# Patient Record
Sex: FEMALE
[De-identification: names, ages, dates, MRNs, and addresses within clinical notes are randomized per-mention and may not be internally consistent; named-entity substitution may affect disease eponyms.]

---

## 2019-12-18 PROBLEM — Z00.00 ENCOUNTER FOR PREVENTIVE HEALTH EXAMINATION: Status: ACTIVE | Noted: 2019-12-18

## 2024-01-16 ENCOUNTER — APPOINTMENT (OUTPATIENT)
Dept: PLASTIC SURGERY | Facility: CLINIC | Age: 69
End: 2024-01-16
Payer: COMMERCIAL

## 2024-01-16 VITALS — HEIGHT: 67 IN | WEIGHT: 177 LBS | BODY MASS INDEX: 27.78 KG/M2

## 2024-01-16 DIAGNOSIS — Z86.79 PERSONAL HISTORY OF OTHER DISEASES OF THE CIRCULATORY SYSTEM: ICD-10-CM

## 2024-01-16 DIAGNOSIS — Z78.9 OTHER SPECIFIED HEALTH STATUS: ICD-10-CM

## 2024-01-16 PROCEDURE — 99203 OFFICE O/P NEW LOW 30 MIN: CPT

## 2024-01-16 RX ORDER — MULTIVITAMIN
TABLET ORAL
Refills: 0 | Status: ACTIVE | COMMUNITY

## 2024-01-16 RX ORDER — MULTIVIT-MIN/IRON/FOLIC ACID/K 18-600-40
CAPSULE ORAL
Refills: 0 | Status: ACTIVE | COMMUNITY

## 2024-01-16 RX ORDER — UBIDECARENONE/VIT E ACET 100MG-5
CAPSULE ORAL
Refills: 0 | Status: ACTIVE | COMMUNITY

## 2024-01-16 RX ORDER — ALPRAZOLAM 0.25 MG/1
0.25 TABLET ORAL
Refills: 0 | Status: ACTIVE | COMMUNITY

## 2024-01-16 RX ORDER — ACETAMINOPHEN/DIPHENHYDRAMINE 500MG-25MG
1000 TABLET ORAL
Refills: 0 | Status: ACTIVE | COMMUNITY

## 2024-01-16 RX ORDER — LOSARTAN POTASSIUM 100 MG/1
TABLET, FILM COATED ORAL
Refills: 0 | Status: ACTIVE | COMMUNITY

## 2024-01-16 NOTE — HISTORY OF PRESENT ILLNESS
[FreeTextEntry1] : 69 y/o female with h/o asthma, pre-DM, HTN and macular degeneration who presents for evaluation of left cheek lesion.  Pt first noticed lesion many years ago. Pt had it injected approx 4x by derm at [facility] derm however continues to recur.  Denies drainage, Denies trauma. No h/o facial fillers or facial surgery.   Denies personal or family h/o skin cancer Denies h/o DVT/PE or MRSA infection Former smoker, quit 40 years ago Occupation:

## 2024-01-16 NOTE — PHYSICAL EXAM
[de-identified] : WDWN in NAD [de-identified] : ATNC [de-identified] : NOMANs [de-identified] : Non labored on RA [de-identified] : WILMANR [de-identified] : Left mid cheek with 7mm circular soft mobile non tender subcutaneous mass with no overlying skin changes. Sensation intact. Facial animation intact.

## 2024-01-16 NOTE — ASSESSMENT
[FreeTextEntry1] : as above left cheek cyst steroid injections (four)  on exam left cheek cyst approx 2cm  suggest excision  Regarding the procedure, we discussed scarring, poor wound healing, bleeding, infection, need for additional surgery, and dissatisfaction with the outcome.  Also discussed possibility of keloid and/or hypertrophic scar formation as well as recurrence.  All questions were answered and risks understood.  hold MVI one week prio

## 2024-03-15 ENCOUNTER — APPOINTMENT (OUTPATIENT)
Dept: PLASTIC SURGERY | Facility: CLINIC | Age: 69
End: 2024-03-15
Payer: MEDICARE

## 2024-03-15 PROCEDURE — 11442 EXC FACE-MM B9+MARG 1.1-2 CM: CPT

## 2024-03-15 PROCEDURE — 13131 CMPLX RPR F/C/C/M/N/AX/G/H/F: CPT | Mod: 59

## 2024-03-15 NOTE — PROCEDURE
[FreeTextEntry6] : Patient is a 68 year old female with a left cheek lesion measuring approximately 1.2 x 1.5 cm.    The area was prepped and draped in the usual fashion.  Local anesthetic was administered using 1% lidocaine with epinephrine.  Lesion was sharply excised.  Area was irrigated copiously.  Complex wound closure was performed in layers.  The wound measured approximately 1.8 cm.  Sterile dressing applied.    Patient tolerated procedure well and understands post-op instructions.  Sutures Used:5-0 monocryl, 6-0 prolene

## 2024-03-20 LAB — CORE LAB BIOPSY: NORMAL

## 2024-03-21 PROBLEM — Z86.03 HISTORY OF NEOPLASM OF UNCERTAIN BEHAVIOR OF FACE: Status: RESOLVED | Noted: 2024-01-16 | Resolved: 2024-03-21

## 2024-03-21 PROBLEM — L72.0 EPIDERMAL INCLUSION CYST: Status: ACTIVE | Noted: 2024-03-21

## 2024-03-22 ENCOUNTER — APPOINTMENT (OUTPATIENT)
Dept: PLASTIC SURGERY | Facility: CLINIC | Age: 69
End: 2024-03-22
Payer: MEDICARE

## 2024-03-22 DIAGNOSIS — L72.0 EPIDERMAL CYST: ICD-10-CM

## 2024-03-22 DIAGNOSIS — Z86.03 PERSONAL HISTORY OF NEOPLASM OF UNCERTAIN BEHAVIOR: ICD-10-CM

## 2024-03-22 PROCEDURE — 99024 POSTOP FOLLOW-UP VISIT: CPT

## 2024-03-22 NOTE — PHYSICAL EXAM
[de-identified] : ATNC [de-identified] : WDWN in NAD [de-identified] : NOMANs [de-identified] : Non labored on RA [de-identified] : WILMANR [de-identified] : Left mid cheek vertical incision is CDi, skin edges well approximated with no open areas or drainage. no cellulitis or erythema. prolene sutures in place. Sensation intact. Facial animation intact.

## 2024-03-22 NOTE — ASSESSMENT
[FreeTextEntry1] : as above left cheek cyst steroid injections (four)  on exam left cheek cyst approx 2cm  Pt is 1 week s/p excision of left cheek lesion. doing very well   - D/c sutures - All bandages changed, steri strips placed - Scar management: Once steris fall off, start Aquaphor x 2 weeks then switch to silicone based scar cream  (Biocorneum) - signs and symptoms of infection reviewed - Light activity ok - ok to shower - Path reviewed: EIC - All post op instructions reviewed, all questions answered - f/u prn

## 2024-03-22 NOTE — HISTORY OF PRESENT ILLNESS
[FreeTextEntry1] : 67 y/o female with h/o asthma, pre-DM, HTN and macular degeneration who presents for evaluation of left cheek lesion.  Pt first noticed lesion many years ago. Pt had it injected approx 4x by derm at [facility] derm however continues to recur.  Denies drainage, Denies trauma. No h/o facial fillers or facial surgery.   Denies personal or family h/o skin cancer Denies h/o DVT/PE or MRSA infection Former smoker, quit 40 years ago  Interval hx (3/22/24): Pt is 1 week s/p excision of left cheek lesion. Here for suture removal. doing very well, denies f/c/drainage. changed bandage once herself.

## 2024-03-22 NOTE — DATA REVIEWED
[FreeTextEntry1] : Patient:     SUMAN BERRY  Accession:                             [id number]  Collected Date/Time:                   3/15/2024 14:25 EDT Received Date/Time:                    3/18/2024 09:02 EDT  Surgical Pathology Report - Auth (Verified)  Specimen(s) Submitted Left cheek cyst  Final Diagnosis  Left cheek cyst, excision:   - Consistent with an epidermal inclusion cyst  Verified by: Ramirez Reagan MD (Electronic Signature) Reported on: 03/20/24 15:08 EDT, [facility], [facility], [address] [place] [address] Histology technical [place] [address] Phone: [phone]   Fax: [phone] _________________________________________________________________   Clinical Information Left cheek cyst  Perioperative Diagnosis D48.7-Neoplasm of uncertain behavior of other specified sites  Gross Description The specimen is received in formalin, labeled "left cheek cyst" and consists of a tan ellipse of skin measuring 1.4 x 0.3 x 0.2 cm with attached fibroadipose tissue, extending 0.5 cm all deep.  Specimen is entirely submitted (1 block)  03/18/2024 14:55:13 EDT ys   Disclaimer In addition to other data that may appear on the specimen containers, all labels have been inspected to confirm the presence of the patients name and date of birth.  Specimen was received and underwent gross examination at [facility], [address] [place] [address].  Ordered by: FREDDIE DELEON IV       Collected/Examined: 15Mar2024 02:25PM       Verification Required       Stage: Final       Performed at: [facility] (Med Director: Riaz Caal)       Resulted: 20Mar2024 03:08PM       Last Updated: 20Mar2024 03:08PM       Accession: [id number]       Results Hx:	 There are no additional results to show Details:	 To Be Done:	15Mar2024 Status:	Resulted: Requires Verification For:	Neoplasm of uncertain behavior of face (D48.7) Overdue:	15Jun2024 02:25PM To Be Performed:	[facility] Communicated By:	Requested transmission to performing location Priority:	Routine Ordered By:	FREDDIE DELEON IV Supervised By:	FREDDIE DELEON IV Managed By:	FREDDIE DELEON IV Authorization:	Not Required Performing Instructions:	 Patient Instructions:	 Order Instructions:	 Questions:	 Date/Time Collected A: 15Mar2024 Number of Sites (Enter each site description below.) A: 1 Site of Specimen A: Left cheek cyst Add'l Details:	 Financial Auth:	Not Needed Authorization #:	 Appt. Status:	Appointment Not Needed Effective:	15Mar2024 12:00AM Expires:	15Mar2025 12:00AM Done:	15Mar2024 02:25PM Order #:	[id number] Requisition #:	[id number] Label Type:	 Collection:	Collection Specimen Identifier:	 ID:	 CPT:	 LOINC:	 SNOMED:	 Type:	 Charges:	None Will Be Collected in Office?	No View link item history	 Goals:	None Charging:	          (none) Encounters:	 Creation:	15Mar2024 Appointment FREDDIE DELEON IV (Plastic Surgery)  Collection:	None Specified Be Done By:	None Specified Scheduled:	None Specified Performed:	None Specified Charge :	None Specified Annotations:	          (none) Electronically Signed By: FREDDIE DELEON IV, MD 15-Mar-2024 2:25 PM

## 2025-03-18 ENCOUNTER — APPOINTMENT (OUTPATIENT)
Dept: ORTHOPEDIC SURGERY | Facility: CLINIC | Age: 70
End: 2025-03-18
Payer: MEDICARE

## 2025-03-18 DIAGNOSIS — M65.312 TRIGGER THUMB, LEFT THUMB: ICD-10-CM

## 2025-03-18 PROCEDURE — 99203 OFFICE O/P NEW LOW 30 MIN: CPT | Mod: 25

## 2025-03-18 PROCEDURE — 20550 NJX 1 TENDON SHEATH/LIGAMENT: CPT | Mod: LT

## 2025-04-30 ENCOUNTER — APPOINTMENT (OUTPATIENT)
Dept: ORTHOPEDIC SURGERY | Facility: CLINIC | Age: 70
End: 2025-04-30

## 2025-04-30 DIAGNOSIS — M65.312 TRIGGER THUMB, LEFT THUMB: ICD-10-CM

## 2025-04-30 PROCEDURE — 99213 OFFICE O/P EST LOW 20 MIN: CPT | Mod: 25

## 2025-04-30 PROCEDURE — 20550 NJX 1 TENDON SHEATH/LIGAMENT: CPT
